# Patient Record
(demographics unavailable — no encounter records)

---

## 2024-10-09 NOTE — ASSESSMENT
[FreeTextEntry1] : 45-year-old male presented with pyelonephritis to [facility] emergency room last Saturday.  Symptoms subsided after starting antibiotic therapy.  Discussed need to treat for 14 days, patient amenable.  Will plan for follow-up ultrasound at the end of the 14 days. [Urinary Tract Infection (599.0\N39.0)] : qualitative ~C was positive

## 2024-10-09 NOTE — REVIEW OF SYSTEMS
[Fever] : fever [Chills] : chills [Feeling Tired] : feeling tired [Dry Eyes] : dryness of the eyes [see HPI] : see HPI [Painful [place]] : painful [place] [Loss of interest] : loss of interest in sexual activity [Urine Infection (bladder/kidney)] : bladder/kidney infection [Pain during urination] : pain during urination [Told you have blood in urine on a urine test] : told blood was present in a urine test [Wake up at night to urinate  How many times?  ___] : wakes up to urinate [unfilled] times during the night [Negative] : Heme/Lymph [FreeTextEntry6] : frequent urination

## 2024-10-09 NOTE — HISTORY OF PRESENT ILLNESS
[FreeTextEntry1] : 45-year-old male presented with complicated UTI to [facility] emergency room last Saturday, CT scan identifying mild left pyelonephritis.  Currently on Augmentin for 7 days which has resolved flank pain as well as urinary symptoms.  Patient denies any UTI in the past 2 years.

## 2024-10-09 NOTE — ASSESSMENT
[FreeTextEntry1] : 45-year-old male presented with pyelonephritis to [facility] emergency room last Saturday.  Symptoms subsided after starting antibiotic therapy.  Discussed need to treat for 14 days, patient amenable.  Will plan for follow-up ultrasound at the end of the 14 days. [Urinary Tract Infection (599.0\N39.0)] : qualitative ~C was positive

## 2024-10-09 NOTE — REVIEW OF SYSTEMS
[Fever] : fever [Chills] : chills [Feeling Tired] : feeling tired [Dry Eyes] : dryness of the eyes [see HPI] : see HPI [Painful [place]] : painful [place] [Loss of interest] : loss of interest in sexual activity [Urine Infection (bladder/kidney)] : bladder/kidney infection [Pain during urination] : pain during urination [Told you have blood in urine on a urine test] : told blood was present in a urine test [Wake up at night to urinate  How many times?  ___] : wakes up to urinate [unfilled] times during the night [Negative] : Heme/Lymph [FreeTextEntry6] : frequent urination

## 2024-10-09 NOTE — HISTORY OF PRESENT ILLNESS
[FreeTextEntry1] : 45-year-old male presented with complicated UTI to [facility] emergency room last Saturday, CT scan identifying mild left pyelonephritis.  Currently on Augmentin for 7 days which has resolved flank pain as well as urinary symptoms.  Patient denies any UTI in the past 2 years.

## 2025-03-09 NOTE — PLAN
[FreeTextEntry1] : 45 yrs old female here for annual wellness examination. Patient is currently smoking 1/2 PPD X 30 yrs and interested in quitting smoking. Patient has a cough in AM- clear mucous- has not had recent imaging- not a candidate for low dose preventive CT scan but agrees to CXR and smoke cessation counseling today. Patient would like to set a quit date and start Chantix Depression screen neg. Discussed Chantix warning regarding change in behavior and mood. (Black box warning prior was removed)  Patient agrees to start.  If any change in behavior will contact me and stop the medication. Patient also wishes to start acupuncture on her own as well Patient also concerned about early menopause which runs in the family Recommend to further discuss with GYN Sister is being checked for hemochromatosis-will check iron studies today. Also, patient has noted some stiffness and joint pains especially in hands.  FH rheumatoid arthritis. WIll check full labwork including TFTs today: CBC, CMP, lipid, TFTs, LFTs, A1c, magnesium, CK, CRP/ESR, rheumatoid factor, DEVON, vitamin levels, urine screen.  Medication reconciliation done-no medication or current allergies presently. No recent vaccines Preventive screening discussed with patient Up-to-date on breast imaging and Pap tests, eye exams. Discussed CRC screening with patient-very interested in starting in the next 1 to 2 years. RV 6 weeks regarding smoking cessation.  Patient understands and agrees with plan Will contact patient with results of all testing.

## 2025-03-09 NOTE — HISTORY OF PRESENT ILLNESS
[FreeTextEntry1] : Annual Wellness examination  [de-identified] : 45 yrs old female here for annual wellness examination. New history:  Irregular periods (prior regular) -concerned about perimenopausal. Patient also has FH: Early menopause. :  UTI- pyelonephritis - may be related to swimming in a pool while traveling PSH:  None PMH: : 2000:  UTI- pyelonephritis - may be related to swimming in a pool while traveling Allergies: NKDA Medication: No Vitamins: No Vaccines: No recent SH: +Smoker 1/2 PPD X 30 yrs cigarettes, no vaping/ecigs/no marijuana.  ETOH: social under 10 drinks per week.  FH: Mother Healthy     Father: healthy   Sister: Abnormal iron 82 % saturation then down to 14%- awaiting further testing results.   GM (M) Stroke 70s .   No FH hemochromatosis (sister being checked now)  FH Early menopause Diet: Healthy Exercise: Regular cardio  Preventive screening:    Breast imaging : Mammogram/sono breasts:  No precancer   GYN/pelvic imaging: Annual normal PAP    Bone density: Not due yet   CRC screening: Upcoming-wishes to do soon   Ophthalmology:  Every other year-normal   Cardiac:  Not yet  EKG normal Labwork: Today  Need for lung cancer screening: Smoker within 15 yrs of 20 +pack yrs/over 50: Not eligible for CT scan yet- but agrees to do CXR Also smoking cessation discussed with patient and is interested in medication and quitting.   Dermatology: Skin exam not yet   Dental: Once a year- good   Fall risk: None   Advance directives: Not yet

## 2025-03-09 NOTE — HISTORY OF PRESENT ILLNESS
[FreeTextEntry1] : Annual Wellness examination  [de-identified] : 45 yrs old female here for annual wellness examination. New history:  Irregular periods (prior regular) -concerned about perimenopausal. Patient also has FH: Early menopause. :  UTI- pyelonephritis - may be related to swimming in a pool while traveling PSH:  None PMH: : 2000:  UTI- pyelonephritis - may be related to swimming in a pool while traveling Allergies: NKDA Medication: No Vitamins: No Vaccines: No recent SH: +Smoker 1/2 PPD X 30 yrs cigarettes, no vaping/ecigs/no marijuana.  ETOH: social under 10 drinks per week.  FH: Mother Healthy     Father: healthy   Sister: Abnormal iron 82 % saturation then down to 14%- awaiting further testing results.   GM (M) Stroke 70s .   No FH hemochromatosis (sister being checked now)  FH Early menopause Diet: Healthy Exercise: Regular cardio  Preventive screening:    Breast imaging : Mammogram/sono breasts:  No precancer   GYN/pelvic imaging: Annual normal PAP    Bone density: Not due yet   CRC screening: Upcoming-wishes to do soon   Ophthalmology:  Every other year-normal   Cardiac:  Not yet  EKG normal Labwork: Today  Need for lung cancer screening: Smoker within 15 yrs of 20 +pack yrs/over 50: Not eligible for CT scan yet- but agrees to do CXR Also smoking cessation discussed with patient and is interested in medication and quitting.   Dermatology: Skin exam not yet   Dental: Once a year- good   Fall risk: None   Advance directives: Not yet

## 2025-03-09 NOTE — HEALTH RISK ASSESSMENT
[Little interest or pleasure doing things] : 1) Little interest or pleasure doing things [Feeling down, depressed, or hopeless] : 2) Feeling down, depressed, or hopeless [0] : 2) Feeling down, depressed, or hopeless: Not at all (0) [PHQ-2 Negative - No further assessment needed] : PHQ-2 Negative - No further assessment needed [Time Spent: ___ Minutes] : I spent [unfilled] minutes performing a depression screening for this patient. [Current] : Current [15-19] : 15-19 [[id number]] : 0

## 2025-03-09 NOTE — REVIEW OF SYSTEMS
[Negative] : Heme/Lymph [FreeTextEntry2] : Patient gained 5 lbs since prior visit [FreeTextEntry9] : Some stiffness in joints especially hands, occasional pain [FreeTextEntry1] : Irregular periods

## 2025-03-09 NOTE — HEALTH RISK ASSESSMENT
[Little interest or pleasure doing things] : 1) Little interest or pleasure doing things [Feeling down, depressed, or hopeless] : 2) Feeling down, depressed, or hopeless [0] : 2) Feeling down, depressed, or hopeless: Not at all (0) [PHQ-2 Negative - No further assessment needed] : PHQ-2 Negative - No further assessment needed [Time Spent: ___ Minutes] : I spent [unfilled] minutes performing a depression screening for this patient. [Current] : Current [15-19] : 15-19 [[id number]] : 0

## 2025-03-09 NOTE — PHYSICAL EXAM
[No Acute Distress] : no acute distress [Well Nourished] : well nourished [Well Developed] : well developed [Well-Appearing] : well-appearing [Normal Sclera/Conjunctiva] : normal sclera/conjunctiva [PERRL] : pupils equal round and reactive to light [EOMI] : extraocular movements intact [Normal Outer Ear/Nose] : the outer ears and nose were normal in appearance [Normal Oropharynx] : the oropharynx was normal [No JVD] : no jugular venous distention [No Lymphadenopathy] : no lymphadenopathy [Supple] : supple [Thyroid Normal, No Nodules] : the thyroid was normal and there were no nodules present [No Respiratory Distress] : no respiratory distress  [No Accessory Muscle Use] : no accessory muscle use [Clear to Auscultation] : lungs were clear to auscultation bilaterally [Normal Rate] : normal rate  [Regular Rhythm] : with a regular rhythm [Normal S1, S2] : normal S1 and S2 [No Murmur] : no murmur heard [Pedal Pulses Present] : the pedal pulses are present [No Edema] : there was no peripheral edema [Soft] : abdomen soft [Non Tender] : non-tender [Non-distended] : non-distended [Normal Posterior Cervical Nodes] : no posterior cervical lymphadenopathy [Normal Anterior Cervical Nodes] : no anterior cervical lymphadenopathy [No CVA Tenderness] : no CVA  tenderness [No Spinal Tenderness] : no spinal tenderness [No Joint Swelling] : no joint swelling [Grossly Normal Strength/Tone] : grossly normal strength/tone [No Rash] : no rash [Coordination Grossly Intact] : coordination grossly intact [No Focal Deficits] : no focal deficits [Normal Gait] : normal gait [Normal Affect] : the affect was normal [Normal Insight/Judgement] : insight and judgment were intact [de-identified] : Normotensive   BMI: 25.61

## 2025-03-09 NOTE — PHYSICAL EXAM
[No Acute Distress] : no acute distress [Well Nourished] : well nourished [Well Developed] : well developed [Well-Appearing] : well-appearing [Normal Sclera/Conjunctiva] : normal sclera/conjunctiva [PERRL] : pupils equal round and reactive to light [EOMI] : extraocular movements intact [Normal Outer Ear/Nose] : the outer ears and nose were normal in appearance [Normal Oropharynx] : the oropharynx was normal [No JVD] : no jugular venous distention [No Lymphadenopathy] : no lymphadenopathy [Supple] : supple [Thyroid Normal, No Nodules] : the thyroid was normal and there were no nodules present [No Respiratory Distress] : no respiratory distress  [No Accessory Muscle Use] : no accessory muscle use [Clear to Auscultation] : lungs were clear to auscultation bilaterally [Normal Rate] : normal rate  [Regular Rhythm] : with a regular rhythm [Normal S1, S2] : normal S1 and S2 [No Murmur] : no murmur heard [Pedal Pulses Present] : the pedal pulses are present [No Edema] : there was no peripheral edema [Soft] : abdomen soft [Non Tender] : non-tender [Non-distended] : non-distended [Normal Posterior Cervical Nodes] : no posterior cervical lymphadenopathy [Normal Anterior Cervical Nodes] : no anterior cervical lymphadenopathy [No CVA Tenderness] : no CVA  tenderness [No Spinal Tenderness] : no spinal tenderness [No Joint Swelling] : no joint swelling [Grossly Normal Strength/Tone] : grossly normal strength/tone [No Rash] : no rash [Coordination Grossly Intact] : coordination grossly intact [No Focal Deficits] : no focal deficits [Normal Gait] : normal gait [Normal Affect] : the affect was normal [Normal Insight/Judgement] : insight and judgment were intact [de-identified] : Normotensive   BMI: 25.61

## 2025-03-09 NOTE — COUNSELING
[Cessation strategies including cessation program discussed] : Cessation strategies including cessation program discussed [Use of nicotine replacement therapies and other medications discussed] : Use of nicotine replacement therapies and other medications discussed [Encouraged to pick a quit date and identify support needed to quit] : Encouraged to pick a quit date and identify support needed to quit [Yes] : Willing to quit smoking [FreeTextEntry2] : Starting Chantix and setting quit date - ready to quit now.  Prescription put in for Chantix starter pack  Discussed how to use.   [FreeTextEntry1] : 6